# Patient Record
Sex: FEMALE | Race: WHITE | URBAN - METROPOLITAN AREA
[De-identification: names, ages, dates, MRNs, and addresses within clinical notes are randomized per-mention and may not be internally consistent; named-entity substitution may affect disease eponyms.]

---

## 2017-03-20 ENCOUNTER — DOCUMENTATION ONLY (OUTPATIENT)
Dept: SURGERY | Age: 32
End: 2017-03-20

## 2017-03-21 ENCOUNTER — OFFICE VISIT (OUTPATIENT)
Dept: SURGERY | Age: 32
End: 2017-03-21

## 2017-03-21 VITALS
SYSTOLIC BLOOD PRESSURE: 107 MMHG | HEART RATE: 90 BPM | DIASTOLIC BLOOD PRESSURE: 73 MMHG | BODY MASS INDEX: 21.34 KG/M2 | WEIGHT: 125 LBS | HEIGHT: 64 IN

## 2017-03-21 DIAGNOSIS — Z80.3 FAMILY HISTORY OF BREAST CANCER: ICD-10-CM

## 2017-03-21 DIAGNOSIS — N60.12 FIBROCYSTIC BREAST CHANGES OF BOTH BREASTS: Primary | ICD-10-CM

## 2017-03-21 DIAGNOSIS — N60.11 FIBROCYSTIC BREAST CHANGES OF BOTH BREASTS: Primary | ICD-10-CM

## 2017-03-21 RX ORDER — LEVOTHYROXINE SODIUM 100 UG/1
TABLET ORAL
Refills: 6 | COMMUNITY
Start: 2017-02-16

## 2017-03-21 RX ORDER — ALPRAZOLAM 0.25 MG/1
TABLET ORAL
Refills: 0 | COMMUNITY
Start: 2017-01-20

## 2017-03-21 NOTE — LETTER
3/21/2017 4:15 PM 
 
Patient:  Helen Porter YOB: 1985 Date of Visit: 3/21/2017 Dear Aleksander Rose MD 
[address] [place] [address] [address] [place] [id number] VIA Facsimile: [phone] 
 : Thank you for referring Ms. Abbie Adrian to me for evaluation/treatment. Below are the relevant portions of my assessment and plan of care. ASSESSMENT and PLAN Encounter Diagnoses Name Primary?  Fibrocystic breast changes of both breasts Yes  Family history of breast cancer Normal exam in this patient with a family history of breast cancer. We discussed her history and genetic testing. We reviewed genetic testing possibilities focusing on breast cancer causing genes. We discussed possible results (positive for a mutation, variant of unknown significance and negative for a mutation), reliability of these results, what these results mean in terms of her personal risk of breast, ovarian and other cancers, treatment to decrease her risk and/or increased monitoring for cancer detection, effect these results would have on family members and the logistics of testing. She will reach out to family members for a copy of their results before we perform genetic testing so that we test appropriately. She was given our fax number to send these results. We discussed following her as a high risk patient and additional screening with breast MRI and a mammogram.  We will wait for genetic testing before we determine what imaging should be done. She will plan to RTC here in 1 year. She is comfortable with this plan. All questions answered and she stated understanding. If you have questions, please do not hesitate to call me. I look forward to following Ms. Payal Andrew along with you. Sincerely, Kristi Grijalva NP

## 2017-03-21 NOTE — PATIENT INSTRUCTIONS
Breast Self-Exam: Care Instructions  Your Care Instructions  A breast self-exam is when you check your breasts for lumps or changes. This regular exam helps you learn how your breasts normally look and feel. Most breast problems or changes are not because of cancer. Breast self-exam is not a substitute for a mammogram. Having regular breast exams by your doctor and regular mammograms improve your chances of finding any problems with your breasts. Some women set a time each month to do a step-by-step breast self-exam. Other women like a less formal system. They might look at their breasts as they brush their teeth, or feel their breasts once in a while in the shower. If you notice a change in your breast, tell your doctor. Follow-up care is a key part of your treatment and safety. Be sure to make and go to all appointments, and call your doctor if you are having problems. Its also a good idea to know your test results and keep a list of the medicines you take. How do you do a breast self-exam?  · The best time to examine your breasts is usually one week after your menstrual period begins. Your breasts should not be tender then. If you do not have periods, you might do your exam on a day of the month that is easy to remember. · To examine your breasts:  ¨ Remove all your clothes above the waist and lie down. When you are lying down, your breast tissue spreads evenly over your chest wall, which makes it easier to feel all your breast tissue. ¨ Use the pads--not the fingertips--of the 3 middle fingers of your left hand to check your right breast. Move your fingers slowly in small coin-sized circles that overlap. ¨ Use three levels of pressure to feel of all your breast tissue. Use light pressure to feel the tissue close to the skin surface. Use medium pressure to feel a little deeper. Use firm pressure to feel your tissue close to your breastbone and ribs.  Use each pressure level to feel your breast tissue before moving on to the next spot. ¨ Check your entire breast, moving up and down as if following a strip from the collarbone to the bra line, and from the armpit to the ribs. Repeat until you have covered the entire breast.  ¨ Repeat this procedure for your left breast, using the pads of the 3 middle fingers of your right hand. · To examine your breasts while in the shower:  ¨ Place one arm over your head and lightly soap your breast on that side. ¨ Using the pads of your fingers, gently move your hand over your breast (in the strip pattern described above), feeling carefully for any lumps or changes. ¨ Repeat for the other breast.  · Have your doctor inspect anything you notice to see if you need further testing. Where can you learn more? Go to http://jefe-jessica.info/. Enter P148 in the search box to learn more about \"Breast Self-Exam: Care Instructions. \"  Current as of: July 26, 2016  Content Version: 11.1  © 8052-1648 [employer]. Care instructions adapted under license by [employer] (which disclaims liability or warranty for this information). If you have questions about a medical condition or this instruction, always ask your healthcare professional. [place] [address] any warranty or liability for your use of this information.

## 2017-03-21 NOTE — PROGRESS NOTES
HISTORY OF PRESENT ILLNESS  Shruti Lang is a 36 y.o. female. HPI  NEW patient here today referred for consultation at the request of Dr. Denisa Sykes for high risk due to family history. Denies any breast problems at this time. No history of breast biopsies. FH includes-  Father diagnosed with colon cancer,  at age 47. Paternal aunt diagnosed with breast cancer at age 21,  at age 61. Had genetic testing before she passed- positive mutation, unknown which one. Paternal cousin (above aunt's daughter) no cancer. Had genetic testing- positive mutation, unknown which one. Paternal aunt no cancer. Had genetic testing- positive mutation, unknown which one. Paternal grandmother no cancer. Had genetic testing- positive mutation, unknown which one. Paternal grandfather diagnosed with prostate cancer. OB History     Obstetric Comments    Menarche:  17. LMP: current. # of Children:  0. Age at Delivery of First Child:  na.   Hysterectomy/oophorectomy:  NO/NO. Breast Bx:  no.  Hx of Breast Feeding:  na. BCP:  no. Hormone therapy:  no.           History reviewed. No pertinent surgical history. No history of breast imaging. ROS  Constitutional: Positive for diaphoresis. Positive for heavy periods and breast pain with periods. HENT: Negative. Eyes: Negative. Respiratory: Negative. Cardiovascular: Negative. Gastrointestinal: Negative. Genitourinary: Positive for frequency. Musculoskeletal: Negative. Skin: Negative. Neurological: Negative. Endo/Heme/Allergies: Negative. Psychiatric/Behavioral: The patient is nervous/anxious. Physical Exam   Constitutional: She is oriented to person, place, and time. She appears well-developed and well-nourished. Pulmonary/Chest: Right breast exhibits no inverted nipple, no mass, no nipple discharge, no skin change and no tenderness.  Left breast exhibits no inverted nipple, no mass, no nipple discharge, no skin change and no tenderness. Breasts are symmetrical.   Fibrocystic breast bilaterally   Musculoskeletal: Normal range of motion. UE x 2   Lymphadenopathy:     She has no cervical adenopathy. She has no axillary adenopathy. Right: No supraclavicular adenopathy present. Left: No supraclavicular adenopathy present. Neurological: She is alert and oriented to person, place, and time. Skin: Skin is warm, dry and intact. Chest and breasts examined   Psychiatric: She has a normal mood and affect. Her speech is normal and behavior is normal.     Visit Vitals    /73    Pulse 90    Ht 5' 4\" (1.626 m)    Wt 125 lb (56.7 kg)    BMI 21.46 kg/m2     ASSESSMENT and PLAN  Encounter Diagnoses   Name Primary?  Fibrocystic breast changes of both breasts Yes    Family history of breast cancer      Normal exam in this patient with a family history of breast cancer. We discussed her history and genetic testing. We reviewed genetic testing possibilities focusing on breast cancer causing genes. We discussed possible results (positive for a mutation, variant of unknown significance and negative for a mutation), reliability of these results, what these results mean in terms of her personal risk of breast, ovarian and other cancers, treatment to decrease her risk and/or increased monitoring for cancer detection, effect these results would have on family members and the logistics of testing. She will reach out to family members for a copy of their results before we perform genetic testing so that we test appropriately. She was given our fax number to send these results. We discussed following her as a high risk patient and additional screening with breast MRI and a mammogram.  We will wait for genetic testing before we determine what imaging should be done. She will plan to RTC here in 1 year. She is comfortable with this plan. All questions answered and she stated understanding.

## 2017-03-21 NOTE — PROGRESS NOTES
HISTORY OF PRESENT ILLNESS  Shruti Lang is a 36 y.o. female. HPI   NEW patient here today referred for consultation at the request of Dr. Denisa Sykes for high risk due to family history. Denies any breast problems at this time. No history of breast biopsies. FH includes-  Father diagnosed with colon cancer,  at age 47. Paternal aunt diagnosed with breast cancer at age 21,  at age 61. Had genetic testing before she passed- positive mutation, unknown which one. Paternal cousin (aunt's daughter) no cancer. Had genetic testing- positive mutation, unknown which one. Paternal aunt no cancer. Had genetic testing- positive mutation, unknown which one. Paternal grandmother no cancer. Had genetic testing- positive mutation, unknown which one. Paternal grandfather diagnosed with prostate cancer. No history of imaging. Review of Systems   Constitutional: Positive for diaphoresis. Positive for heavy periods and breast pain with periods. HENT: Negative. Eyes: Negative. Respiratory: Negative. Cardiovascular: Negative. Gastrointestinal: Negative. Genitourinary: Positive for frequency. Musculoskeletal: Negative. Skin: Negative. Neurological: Negative. Endo/Heme/Allergies: Negative. Psychiatric/Behavioral: The patient is nervous/anxious.         Physical Exam    ASSESSMENT and PLAN  {ASSESSMENT/PLAN:43475}